# Patient Record
Sex: MALE | Race: BLACK OR AFRICAN AMERICAN | NOT HISPANIC OR LATINO | URBAN - METROPOLITAN AREA
[De-identification: names, ages, dates, MRNs, and addresses within clinical notes are randomized per-mention and may not be internally consistent; named-entity substitution may affect disease eponyms.]

---

## 2018-01-28 ENCOUNTER — EMERGENCY (EMERGENCY)
Facility: HOSPITAL | Age: 28
LOS: 1 days | Discharge: HOME | End: 2018-01-28
Admitting: EMERGENCY MEDICINE
Payer: COMMERCIAL

## 2018-01-28 VITALS
OXYGEN SATURATION: 98 % | SYSTOLIC BLOOD PRESSURE: 130 MMHG | TEMPERATURE: 98 F | RESPIRATION RATE: 17 BRPM | DIASTOLIC BLOOD PRESSURE: 74 MMHG | HEART RATE: 80 BPM

## 2018-01-28 DIAGNOSIS — L02.411 CUTANEOUS ABSCESS OF RIGHT AXILLA: ICD-10-CM

## 2018-01-28 PROCEDURE — 10061 I&D ABSCESS COMP/MULTIPLE: CPT

## 2018-01-28 PROCEDURE — 99284 EMERGENCY DEPT VISIT MOD MDM: CPT | Mod: 25

## 2018-01-28 PROCEDURE — 99283 EMERGENCY DEPT VISIT LOW MDM: CPT | Mod: 25

## 2018-01-28 RX ORDER — OXYCODONE AND ACETAMINOPHEN 5; 325 MG/1; MG/1
1 TABLET ORAL ONCE
Qty: 0 | Refills: 0 | Status: DISCONTINUED | OUTPATIENT
Start: 2018-01-28 | End: 2018-01-28

## 2018-01-28 RX ORDER — CEPHALEXIN 500 MG
1 CAPSULE ORAL
Qty: 30 | Refills: 0 | OUTPATIENT
Start: 2018-01-28 | End: 2018-02-06

## 2018-01-28 RX ORDER — AZTREONAM 2 G
1 VIAL (EA) INJECTION
Qty: 20 | Refills: 0 | OUTPATIENT
Start: 2018-01-28 | End: 2018-02-06

## 2018-01-28 RX ADMIN — OXYCODONE AND ACETAMINOPHEN 1 TABLET(S): 5; 325 TABLET ORAL at 04:01

## 2018-01-28 NOTE — ED PROVIDER NOTE - MEDICAL DECISION MAKING DETAILS
abscess I and D with surrounding cellulitis will DC with ABs and pain meds I have discussed the discharge plan with the patient. The patient agrees with the plan, as discussed.  The patient understands Emergency Department diagnosis is a preliminary diagnosis often based on limited information and that the patient must adhere to the follow-up plan as discussed.  The patient understands that if the symptoms worsen or if prescribed medications do not have the desired/planned effect that the patient may return to the Emergency Department at any time for further evaluation and treatment.

## 2018-01-28 NOTE — ED PROVIDER NOTE - OBJECTIVE STATEMENT
pt to ed co abscess to right arm pit with red and warmth 8/10 no radiation no alleviating factors onset sudden sharp pain no fever no dizzy no headache no chills no NVD no chest pain no SOB no shakes no aches no other  injury no other complaints

## 2018-01-28 NOTE — ED ADULT TRIAGE NOTE - ARRIVAL INFO ADDITIONAL COMMENTS
Pt walked into ED with c/o of right armpit 2 raised abscess noted with white purulent drainage and redness. Onset was yesterday. Pt denies fever, chills, pain. Pt denies taking anything for relief. No medical HX

## 2018-02-01 ENCOUNTER — EMERGENCY (EMERGENCY)
Facility: HOSPITAL | Age: 28
LOS: 1 days | Discharge: HOME | End: 2018-02-01
Admitting: EMERGENCY MEDICINE
Payer: COMMERCIAL

## 2018-02-01 VITALS
HEIGHT: 68 IN | SYSTOLIC BLOOD PRESSURE: 122 MMHG | RESPIRATION RATE: 18 BRPM | HEART RATE: 81 BPM | DIASTOLIC BLOOD PRESSURE: 78 MMHG | OXYGEN SATURATION: 96 % | TEMPERATURE: 99 F | WEIGHT: 164.91 LBS

## 2018-02-01 DIAGNOSIS — L02.411 CUTANEOUS ABSCESS OF RIGHT AXILLA: ICD-10-CM

## 2018-02-01 DIAGNOSIS — Z48.00 ENCOUNTER FOR CHANGE OR REMOVAL OF NONSURGICAL WOUND DRESSING: ICD-10-CM

## 2018-02-01 DIAGNOSIS — Z79.2 LONG TERM (CURRENT) USE OF ANTIBIOTICS: ICD-10-CM

## 2018-02-01 DIAGNOSIS — Z79.891 LONG TERM (CURRENT) USE OF OPIATE ANALGESIC: ICD-10-CM

## 2018-02-01 PROCEDURE — 99282 EMERGENCY DEPT VISIT SF MDM: CPT

## 2018-02-01 NOTE — ED ADULT NURSE NOTE - OBJECTIVE STATEMENT
Pt had R axillary abscess I&D on 1/28/18 at [facility] and was told to come back for wound check and to have packing removed. Pt states that he feels much better and denies pain or bleeding/drainage

## 2018-02-01 NOTE — ED PROVIDER NOTE - MEDICAL DECISION MAKING DETAILS
Young male here for wound check over R axillary abscess/cellulitis. Site healing well, cellulitis resolving, packing changed, plan to f/u in 3-4d for wound check and advised on abx completion.

## 2018-02-01 NOTE — ED PROVIDER NOTE - OBJECTIVE STATEMENT
27M with no sig pmh    here 4d ago for R axillary abscess/cellulitis treated with I&D and abx (bactrim/keflex) here for wound check. reports improvement and regression of erythema and purulent discharge decreasing.     denies f/c, cp, sob, moses, abd pain, n/v/c/d, urinary complaints.

## 2018-02-01 NOTE — ED PROVIDER NOTE - SKIN, MLM
R axillary abscess with packing, mild purulent discharge from site. Packing removed and changed. No surrounding celluliitis.

## 2018-02-07 ENCOUNTER — EMERGENCY (EMERGENCY)
Facility: HOSPITAL | Age: 28
LOS: 1 days | Discharge: HOME | End: 2018-02-07
Admitting: EMERGENCY MEDICINE
Payer: COMMERCIAL

## 2018-02-07 VITALS
DIASTOLIC BLOOD PRESSURE: 80 MMHG | OXYGEN SATURATION: 97 % | WEIGHT: 169.98 LBS | TEMPERATURE: 98 F | RESPIRATION RATE: 18 BRPM | SYSTOLIC BLOOD PRESSURE: 115 MMHG | HEART RATE: 68 BPM | HEIGHT: 68 IN

## 2018-02-07 DIAGNOSIS — Z48.01 ENCOUNTER FOR CHANGE OR REMOVAL OF SURGICAL WOUND DRESSING: ICD-10-CM

## 2018-02-07 PROCEDURE — 99212 OFFICE O/P EST SF 10 MIN: CPT

## 2018-02-07 NOTE — ED PROVIDER NOTE - OBJECTIVE STATEMENT
26 yo male in the ER for a wound check. Pt had abscess under his right axilla , I&D was done 3 days ago and he was sent home with packing in his wound. Pt noted improvement. Pain ands welling subsided, pt denies any fever or chills.

## 2018-02-07 NOTE — ED PROVIDER NOTE - MEDICAL DECISION MAKING DETAILS
28 yo male in the ER for a wound check. on PO abx. wound is healing well, no cellulitis, no drainage, wound care discussed. stable for dc

## 2018-02-07 NOTE — ED ADULT NURSE NOTE - OBJECTIVE STATEMENT
Pt reports that he had an two abscess cut and drained 6 days ago and was told to come back to the ER to have it checked. Pt states that the wounds were bleeding "a little bit" yesterday but stopped, but after was having yellow drainage. Pt reports that the area "feels better" and is "able to move my arm around without it hurting now." Pt denies chills, fever, or any other symptoms.

## 2018-02-07 NOTE — ED PROVIDER NOTE - SKIN, MLM
Skin normal color for race, warm, dry and intact. No evidence of rash.  no drainage from the wound under right axilla, no cellulitis.

## 2021-06-08 NOTE — ED PROVIDER NOTE - NSTIMEPROVIDERCAREINITIATE_GEN_ER
Assumed care of patient this am, A/OX4, VSS at this time. Sitting up in bed eating breakfast. Pt left hand with 3+ edema, bilateral legs edematous as well. Pt on 5L O2 at this time. Denies pain, states he does not walk anymore but has not used a wheelchair yet. Discharge plan pending.    07-Feb-2018 16:26

## 2025-07-25 NOTE — ED PROVIDER NOTE - CROS ED ROS STATEMENT
Consent: The patient's consent was obtained including but not limited to risks of crusting, scabbing, blistering, scarring, darker or lighter pigmentary change, recurrence, incomplete removal and infection.
Detail Level: Detailed
Render Post-Care Instructions In Note?: no
Show Applicator Variable?: Yes
Application Tool (Optional): Liquid Nitrogen Sprayer
Duration Of Freeze Thaw-Cycle (Seconds): 0
Post-Care Instructions: I reviewed with the patient in detail post-care instructions. Patient is to wear sunprotection, and avoid picking at any of the treated lesions. Pt may apply Vaseline to crusted or scabbing areas.
all other ROS negative except as per HPI